# Patient Record
Sex: MALE | Race: WHITE | NOT HISPANIC OR LATINO | Employment: OTHER | URBAN - METROPOLITAN AREA
[De-identification: names, ages, dates, MRNs, and addresses within clinical notes are randomized per-mention and may not be internally consistent; named-entity substitution may affect disease eponyms.]

---

## 2022-08-18 ENCOUNTER — HOSPITAL ENCOUNTER (EMERGENCY)
Facility: CLINIC | Age: 82
Discharge: HOME | End: 2022-08-19
Attending: EMERGENCY MEDICINE | Admitting: EMERGENCY MEDICINE
Payer: MEDICARE

## 2022-08-18 DIAGNOSIS — N28.9 RENAL INSUFFICIENCY: ICD-10-CM

## 2022-08-18 DIAGNOSIS — U07.1 INFECTION DUE TO 2019 NOVEL CORONAVIRUS: ICD-10-CM

## 2022-08-18 LAB
ALBUMIN UR-MCNC: 10 MG/DL
ANION GAP SERPL CALCULATED.3IONS-SCNC: 7 MMOL/L (ref 5–18)
APPEARANCE UR: CLEAR
BASOPHILS # BLD AUTO: 0 10E3/UL (ref 0–0.2)
BASOPHILS NFR BLD AUTO: 0 %
BILIRUB UR QL STRIP: NEGATIVE
BNP SERPL-MCNC: 33 PG/ML (ref 0–84)
BUN SERPL-MCNC: 34 MG/DL (ref 8–28)
CALCIUM SERPL-MCNC: 8.8 MG/DL (ref 8.5–10.5)
CHLORIDE BLD-SCNC: 102 MMOL/L (ref 98–107)
CO2 SERPL-SCNC: 23 MMOL/L (ref 22–31)
COLOR UR AUTO: ABNORMAL
CREAT SERPL-MCNC: 1.89 MG/DL (ref 0.7–1.3)
EOSINOPHIL # BLD AUTO: 0.3 10E3/UL (ref 0–0.7)
EOSINOPHIL NFR BLD AUTO: 5 %
ERYTHROCYTE [DISTWIDTH] IN BLOOD BY AUTOMATED COUNT: 12.1 % (ref 10–15)
FLUAV RNA SPEC QL NAA+PROBE: NEGATIVE
FLUBV RNA RESP QL NAA+PROBE: NEGATIVE
GFR SERPL CREATININE-BSD FRML MDRD: 36 ML/MIN/1.73M2
GLUCOSE BLD-MCNC: 94 MG/DL (ref 70–125)
GLUCOSE UR STRIP-MCNC: NEGATIVE MG/DL
HCT VFR BLD AUTO: 38.7 % (ref 40–53)
HGB BLD-MCNC: 13.5 G/DL (ref 13.3–17.7)
HGB UR QL STRIP: NEGATIVE
HYALINE CASTS: 11 /LPF
IMM GRANULOCYTES # BLD: 0 10E3/UL
IMM GRANULOCYTES NFR BLD: 0 %
KETONES UR STRIP-MCNC: NEGATIVE MG/DL
LEUKOCYTE ESTERASE UR QL STRIP: ABNORMAL
LYMPHOCYTES # BLD AUTO: 1.3 10E3/UL (ref 0.8–5.3)
LYMPHOCYTES NFR BLD AUTO: 21 %
MCH RBC QN AUTO: 31 PG (ref 26.5–33)
MCHC RBC AUTO-ENTMCNC: 34.9 G/DL (ref 31.5–36.5)
MCV RBC AUTO: 89 FL (ref 78–100)
MONOCYTES # BLD AUTO: 0.5 10E3/UL (ref 0–1.3)
MONOCYTES NFR BLD AUTO: 8 %
MUCOUS THREADS #/AREA URNS LPF: PRESENT /LPF
NEUTROPHILS # BLD AUTO: 3.9 10E3/UL (ref 1.6–8.3)
NEUTROPHILS NFR BLD AUTO: 66 %
NITRATE UR QL: NEGATIVE
NRBC # BLD AUTO: 0 10E3/UL
NRBC BLD AUTO-RTO: 0 /100
PH UR STRIP: 5.5 [PH] (ref 5–7)
PLATELET # BLD AUTO: 185 10E3/UL (ref 150–450)
POTASSIUM BLD-SCNC: 5 MMOL/L (ref 3.5–5)
RBC # BLD AUTO: 4.36 10E6/UL (ref 4.4–5.9)
RBC URINE: 4 /HPF
RSV RNA SPEC NAA+PROBE: NEGATIVE
SARS-COV-2 RNA RESP QL NAA+PROBE: POSITIVE
SODIUM SERPL-SCNC: 132 MMOL/L (ref 136–145)
SP GR UR STRIP: >1.05 (ref 1–1.03)
UROBILINOGEN UR STRIP-MCNC: <2 MG/DL
WBC # BLD AUTO: 6 10E3/UL (ref 4–11)
WBC URINE: 7 /HPF

## 2022-08-18 PROCEDURE — 87637 SARSCOV2&INF A&B&RSV AMP PRB: CPT | Performed by: EMERGENCY MEDICINE

## 2022-08-18 PROCEDURE — 80048 BASIC METABOLIC PNL TOTAL CA: CPT | Performed by: EMERGENCY MEDICINE

## 2022-08-18 PROCEDURE — 36415 COLL VENOUS BLD VENIPUNCTURE: CPT | Performed by: EMERGENCY MEDICINE

## 2022-08-18 PROCEDURE — 258N000003 HC RX IP 258 OP 636: Performed by: EMERGENCY MEDICINE

## 2022-08-18 PROCEDURE — 85025 COMPLETE CBC W/AUTO DIFF WBC: CPT | Performed by: EMERGENCY MEDICINE

## 2022-08-18 PROCEDURE — 96360 HYDRATION IV INFUSION INIT: CPT

## 2022-08-18 PROCEDURE — C9803 HOPD COVID-19 SPEC COLLECT: HCPCS

## 2022-08-18 PROCEDURE — 99284 EMERGENCY DEPT VISIT MOD MDM: CPT | Mod: CS,25

## 2022-08-18 PROCEDURE — 81003 URINALYSIS AUTO W/O SCOPE: CPT | Performed by: EMERGENCY MEDICINE

## 2022-08-18 PROCEDURE — 93005 ELECTROCARDIOGRAM TRACING: CPT | Performed by: EMERGENCY MEDICINE

## 2022-08-18 PROCEDURE — 83880 ASSAY OF NATRIURETIC PEPTIDE: CPT | Performed by: EMERGENCY MEDICINE

## 2022-08-18 RX ORDER — SODIUM CHLORIDE 9 MG/ML
INJECTION, SOLUTION INTRAVENOUS CONTINUOUS
Status: DISCONTINUED | OUTPATIENT
Start: 2022-08-18 | End: 2022-08-19 | Stop reason: HOSPADM

## 2022-08-18 RX ADMIN — SODIUM CHLORIDE 1000 ML: 9 INJECTION, SOLUTION INTRAVENOUS at 22:53

## 2022-08-18 ASSESSMENT — ENCOUNTER SYMPTOMS
COUGH: 1
VOMITING: 0
WEAKNESS: 0
NUMBNESS: 0
NAUSEA: 0
FEVER: 0
DIZZINESS: 1

## 2022-08-18 ASSESSMENT — ACTIVITIES OF DAILY LIVING (ADL): ADLS_ACUITY_SCORE: 35

## 2022-08-19 VITALS
SYSTOLIC BLOOD PRESSURE: 147 MMHG | BODY MASS INDEX: 26.24 KG/M2 | HEIGHT: 73 IN | RESPIRATION RATE: 18 BRPM | DIASTOLIC BLOOD PRESSURE: 89 MMHG | OXYGEN SATURATION: 100 % | WEIGHT: 198 LBS | TEMPERATURE: 97 F | HEART RATE: 65 BPM

## 2022-08-19 LAB
ATRIAL RATE - MUSE: 65 BPM
DIASTOLIC BLOOD PRESSURE - MUSE: NORMAL MMHG
INTERPRETATION ECG - MUSE: NORMAL
P AXIS - MUSE: 66 DEGREES
PR INTERVAL - MUSE: 166 MS
QRS DURATION - MUSE: 78 MS
QT - MUSE: 384 MS
QTC - MUSE: 399 MS
R AXIS - MUSE: 27 DEGREES
SYSTOLIC BLOOD PRESSURE - MUSE: NORMAL MMHG
T AXIS - MUSE: 51 DEGREES
VENTRICULAR RATE- MUSE: 65 BPM

## 2022-08-19 NOTE — ED PROVIDER NOTES
EMERGENCY DEPARTMENT ENCOUNTER      NAME: Silvino Leon  AGE: 81 year old male  YOB: 1940  MRN: [id number]  EVALUATION DATE & TIME: 8/18/2022 10:15 PM    PCP: No primary care provider on file.    ED PROVIDER: Chance Gray M.D.      Chief Complaint   Patient presents with     acute kidney injury         FINAL IMPRESSION:  1. Infection due to 2019 novel coronavirus    2. Renal insufficiency          ED COURSE & MEDICAL DECISION MAKING:    Pertinent Labs & Imaging studies reviewed. (See chart for details)  81 year old male presents to the Emergency Department for evaluation of dyspnea.  Patient's had worsening dyspnea for the last few days.  Was actually seen in urgent care earlier.  Found to have an elevated D-dimer.  CT scan was done that did not show PE or pneumonia.  Found to have renal insufficiency so was sent here for further evaluation.  Did review the visits.  Creatinine just above 2.  Given IV fluids here.  Did start working up his shortness of breath.  Creatinine is actually improving.  I did review the CT scan there is no obvious pleural effusions or signs of fluid overload.  EKG and troponin are negative here.  BNP is negative.  White count is normal.  COVID is positive which is likely the cause of his shortness of breath.  Discussed further treatment at home.  I do not think he is a candidate for Paxil bid as the symptoms have been going on for 2 weeks.  Renal insufficiency seems to be improving.  We will have him continue to hydrate at home.  We will follow-up with his primary next week for recheck.  Return for worsening symptoms.    10:25 PM I met with the patient to gather history and to perform my initial exam. I discussed the plan for care while in the Emergency Department. PPE: eye protection, surgical mask and nitrile gloves.  11:53 PM Results were communicated with the patient. Discussed discharge plans along with return precautions. Patient was agreeable with plan.        At  "the conclusion of the encounter I discussed the results of all of the tests and the disposition. The questions were answered. The patient or family acknowledged understanding and was agreeable with the care plan.       MEDICATIONS GIVEN IN THE EMERGENCY:  Medications   0.9% sodium chloride BOLUS (0 mLs Intravenous Stopped 8/18/22 8932)       NEW PRESCRIPTIONS STARTED AT TODAY'S ER VISIT  Discharge Medication List as of 8/18/2022 11:57 PM             =================================================================    HPI    Patient information was obtained from: patient     Use of : N/A        Silvino Leon is a 81 year old male with a pertinent history of hypertension, hyperlipidemia, prostate cancer, a. Fib, s/p mitral valve replacement and s/p CABG, who presents to this ED via walk in with wife for evaluation of shortness of breath, mid chest pain.    Patient here with worsening difficulty breathing for the past week with exertion. He says when he walks from his home to his car, he has to catch his breath. He usually is active and walks everyday but this has been progressively worsening which prompt him to initially go into Urgent Care where they advised him to go [facility] for [facility] scan which was negative but they were worried about possible kidney issues and advised to go into ED. He does endorse some mid chest pain that he describes as a \"soreness\". He does get episodes where he coughs for about 8-10 times and then doesn't cough for a while. He does get \"room spinning\" dizzy when he bends down. No weakness or numbness. He did have a negative at home COVID test this morning. He did have prior COVID-19 infection a month ago but has fully recovered from it.     He is otherwise fully vaccinated against COVID-19 x3. He does take blood pressure medications. He has had prior cardiac history of valve replacement and takes 2 baby aspirin daily. He is not on blood thinners. No fever. No nausea, " "vomiting. No changes in bowel or bladder habits. No other medical complaints at this time.    Chart Review:  8/18/22:   [facility]: Patient presented shortness of breath. EKG was normal. Chest X-Ray was also unremarkable for infection/fluid in lungs. D-dimer was 0.55 and advised to go into [facility]. Patient discharged with albuterol inhaler.   [facility]: Patient transferred for CT chest scan. CTA Chest showed: 1.  No pulmonary embolus aortic dissection, or aneurysm. 2.  Coronary artery disease status post CABG. 3.  3 cm left thyroid nodule, recommend nonemergent evaluation with thyroid ultrasound. Patient advised to go into ED for further work up and treatment.     REVIEW OF SYSTEMS   Review of Systems   Constitutional: Negative for fever.   Respiratory: Positive for cough (mild).    Cardiovascular: Positive for chest pain (mid).   Gastrointestinal: Negative for nausea and vomiting.   Genitourinary: Negative.    Neurological: Positive for dizziness (\"room spinning\" (only with bending down)). Negative for weakness and numbness.   All other systems reviewed and are negative.       PAST MEDICAL HISTORY:  History reviewed. No pertinent past medical history.    PAST SURGICAL HISTORY:  History reviewed. No pertinent surgical history.        CURRENT MEDICATIONS:    No current facility-administered medications for this encounter.     Current Outpatient Medications   Medication     allopurinol (ZYLOPRIM) 100 MG tablet     amiodarone (PACERONE) 200 MG tablet     aspirin 325 MG tablet     metoprolol tartrate (LOPRESSOR) 25 MG tablet     simvastatin (ZOCOR) 40 MG tablet         ALLERGIES:  No Known Allergies    FAMILY HISTORY:  History reviewed. No pertinent family history.    SOCIAL HISTORY:   Social History     Socioeconomic History     Marital status:        VITALS:  BP (P) 122/80   Pulse (P) 67   Temp 97  F (36.1  C) (Oral)   Resp (P) 16   Ht 1.854 m (6' 1\")   Wt 89.8 kg (198 " lb)   SpO2 (P) 99%   BMI 26.12 kg/m      PHYSICAL EXAM    Physical Exam  Constitutional:       General: He is not in acute distress.     Appearance: He is not diaphoretic.   HENT:      Head: Atraumatic.   Eyes:      General: No scleral icterus.     Pupils: Pupils are equal, round, and reactive to light.   Cardiovascular:      Heart sounds: Normal heart sounds.   Pulmonary:      Effort: No respiratory distress.      Breath sounds: Normal breath sounds.   Abdominal:      General: Bowel sounds are normal.      Palpations: Abdomen is soft.      Tenderness: There is no abdominal tenderness.   Musculoskeletal:         General: No tenderness.   Skin:     General: Skin is warm.      Findings: No rash.           LAB:  All pertinent labs reviewed and interpreted.  Labs Ordered and Resulted from Time of ED Arrival to Time of ED Departure   ROUTINE UA WITH MICROSCOPIC REFLEX TO CULTURE - Abnormal       Result Value    Color Urine Light Yellow      Appearance Urine Clear      Glucose Urine Negative      Bilirubin Urine Negative      Ketones Urine Negative      Specific Gravity Urine >1.050 (*)     Blood Urine Negative      pH Urine 5.5      Protein Albumin Urine 10  (*)     Urobilinogen Urine <2.0      Nitrite Urine Negative      Leukocyte Esterase Urine 75 Joey/uL (*)     Mucus Urine Present (*)     RBC Urine 4 (*)     WBC Urine 7 (*)     Hyaline Casts Urine 11 (*)    BASIC METABOLIC PANEL - Abnormal    Sodium 132 (*)     Potassium 5.0      Chloride 102      Carbon Dioxide (CO2) 23      Anion Gap 7      Urea Nitrogen 34 (*)     Creatinine 1.89 (*)     Calcium 8.8      Glucose 94      GFR Estimate 36 (*)    INFLUENZA A/B & SARS-COV2 PCR MULTIPLEX - Abnormal    Influenza A PCR Negative      Influenza B PCR Negative      RSV PCR Negative      SARS CoV2 PCR Positive (*)    CBC WITH PLATELETS AND DIFFERENTIAL - Abnormal    WBC Count 6.0      RBC Count 4.36 (*)     Hemoglobin 13.5      Hematocrit 38.7 (*)     MCV 89      MCH 31.0       MCHC 34.9      RDW 12.1      Platelet Count 185      % Neutrophils 66      % Lymphocytes 21      % Monocytes 8      % Eosinophils 5      % Basophils 0      % Immature Granulocytes 0      NRBCs per 100 WBC 0      Absolute Neutrophils 3.9      Absolute Lymphocytes 1.3      Absolute Monocytes 0.5      Absolute Eosinophils 0.3      Absolute Basophils 0.0      Absolute Immature Granulocytes 0.0      Absolute NRBCs 0.0     B-TYPE NATRIURETIC PEPTIDE ( EAST ONLY) - Normal    BNP 33         RADIOLOGY:  Reviewed all pertinent imaging. Please see official radiology report.  No orders to display       EKG:    Performed at: 2255  Impression: Normal EKG.  No previous  Normal sinus rhythm retrograde 65.  .  QRS 78.  QTc 399    I have independently reviewed and interpreted the EKG(s) documented above.    PROCEDURES:   None      I, Josy Henson, am serving as a scribe to document services personally performed by Dr. Chance Gray, based on my observation and the provider's statements to me. I, Chance Gray MD attest that Josy Henson is acting in a scribe capacity, has observed my performance of the services and has documented them in accordance with my direction.    Chance Gray M.D.  Emergency Medicine  [facility] EMERGENCY ROOM  [address] [id number] [phone]  Dept: [phone]     Chance Gray MD  08/19/22 8408

## 2022-08-19 NOTE — ED NOTES
Expected Patient Referral to ED  9:45 PM    Referring Clinic/Provider:  Dr. Bose- Urgent Care    Reason for referral/Clinical facts:  Acute kidney injury and hyperkalemia. Symptoms were fatigue for several weeks. Not eating or drinking well. Vitals are stable. Elevated D-dimer, CTA of chest done was normal and ECG normal.     Recommendations provided:  Contact inpatient service to discuss direct admission    Caller was informed that this institution does possess the capabilities and/or resources to provide for patient and should be transferred to our facility.    Discussed that if direct admit is sought and any hurdles are encountered, this ED would be happy to see the patient and evaluate.    Informed caller that recommendations provided are recommendations based only on the facts provided and that they responsible to accept or reject the advice, or to seek a formal in person consultation as needed and that this ED will see/treat patient should they arrive.      LÓPEZ TRAN MD  [facility] EMERGENCY ROOM  [address] [id number] [phone]       López Tran MD  08/18/22 5317

## 2022-08-19 NOTE — ED TRIAGE NOTES
"Patient was sent from the urgency room for additional work up, was found to have an elevated creatinine and hyperkalemia; pain located mid abdominal area with \"spasms\" that wraps around to the back.  Denies fever chills; see note for additional details.       "

## 2025-07-16 ENCOUNTER — TELEPHONE (OUTPATIENT)
Dept: FAMILY MEDICINE | Facility: CLINIC | Age: 85
End: 2025-07-16
Payer: MEDICARE

## 2025-07-16 NOTE — TELEPHONE ENCOUNTER
Reason for Call:  Appointment Request    Patient requesting this type of appt:  Preventive     Requested provider: MARTIN MONDRAGON    Reason patient unable to be scheduled: closed practice    When does patient want to be seen/preferred time: asap    Comments: looking to establish care     Okay to leave a detailed message?: Yes at Cell number on file:    Telephone Information:   home [phone]       Call taken on 7/16/2025 at 12:14 PM by Marta Patricia

## 2025-07-16 NOTE — TELEPHONE ENCOUNTER
Contacts       Contact Date/Time Type Contact Phone/Fax    07/16/2025 12:13 PM CDT Phone (Incoming) Silvino Leon (Self) [phone] (H)    07/16/2025 04:01 PM CDT Phone (Outgoing) Silvino Leon (Self) [phone] (H)    Left Message           Attempted to reach patient to: Schedule an appointment    When patient returns call, please take this action: Assist with scheduling    Reason for the visit: Preventive/establish care     When to schedule: Next available    Additional comments/info: Okay to schedule patient per Dr. Mccullough. 40 minute time slot.    If unable to schedule: Transfer to TC line (or update telephone encounter in after-hours)

## 2025-07-17 NOTE — TELEPHONE ENCOUNTER
General Call    Contacts       Contact Date/Time Type Contact Phone/Fax    07/16/2025 12:13 PM CDT Phone (Incoming) Silvino Leon (Self) [phone] (H)    07/16/2025 04:01 PM CDT Phone (Outgoing) Silvino Leon (Self) [phone] (H)    Left Message           Reason for Call:  return call     What are your questions or concerns:  patient does not want to schedule anymore at this time    Date of last appointment with provider: n/a    Okay to leave a detailed message?: No at Cell number on file:    Telephone Information:   Mobile [phone]